# Patient Record
Sex: FEMALE | Race: BLACK OR AFRICAN AMERICAN
[De-identification: names, ages, dates, MRNs, and addresses within clinical notes are randomized per-mention and may not be internally consistent; named-entity substitution may affect disease eponyms.]

---

## 2018-03-13 ENCOUNTER — HOSPITAL ENCOUNTER (EMERGENCY)
Dept: HOSPITAL 14 - H.ER | Age: 33
Discharge: HOME | End: 2018-03-13
Payer: COMMERCIAL

## 2018-03-13 VITALS
RESPIRATION RATE: 18 BRPM | SYSTOLIC BLOOD PRESSURE: 108 MMHG | DIASTOLIC BLOOD PRESSURE: 68 MMHG | HEART RATE: 78 BPM | TEMPERATURE: 97.8 F

## 2018-03-13 VITALS — OXYGEN SATURATION: 100 %

## 2018-03-13 DIAGNOSIS — J01.90: Primary | ICD-10-CM

## 2018-03-13 PROCEDURE — 81025 URINE PREGNANCY TEST: CPT

## 2018-03-13 PROCEDURE — 99282 EMERGENCY DEPT VISIT SF MDM: CPT

## 2018-03-13 PROCEDURE — 70450 CT HEAD/BRAIN W/O DYE: CPT

## 2018-03-13 PROCEDURE — 96372 THER/PROPH/DIAG INJ SC/IM: CPT

## 2018-03-13 PROCEDURE — 87804 INFLUENZA ASSAY W/OPTIC: CPT

## 2018-03-13 NOTE — ED PDOC
HPI: General Adult


Time Seen by Provider: 18 15:18


Chief Complaint (Nursing): ENT Problem


Chief Complaint (Provider): left side facial pain and headache


History Per: Patient


History/Exam Limitations: no limitations


Onset/Duration Of Symptoms: Days (x2)


Current Symptoms Are (Timing): Still Present


Recently: Treated By A Physician


Additional Complaint(s): 





Pee Singh is a 32 year old female, with no significant past medical history

, who presents to the emergency department complaining of left facial pain and 

left sided headache onset for 2 days. Patient also reports congestion and left 

ear pain. Patient states she was seen on Saturday by her PMD for runny nose and 

sore throat and she was prescribed Azithromycin and Promethazine. She took 

Advil yesterday but with no improvement of pain. She denies any vision changes, 

fever, chills, nausea, vomit or dizziness. Patient states prescribed meds have 

not helped with symptoms.





PMD: Abdirahman Gómez 





Past Medical History


Reviewed: Historical Data, Nursing Documentation, Vital Signs


Vital Signs: 


 Last Vital Signs











Temp  97.8 F   18 17:24


 


Pulse  78   18 17:24


 


Resp  18   18 17:24


 


BP  108/68   18 17:24


 


Pulse Ox  100   18 17:34














- Medical History


PMH: No Chronic Diseases





- Surgical History


Surgical History:  (x 2)





- Family History


Family History: States: No Known Family Hx





- Living Arrangements


Living Arrangements: With Family





- Social History


Current smoker - smoking cessation education provided: No


Alcohol: None


Drugs: Denies





- Home Medications


Home Medications: 


 Ambulatory Orders











 Medication  Instructions  Recorded


 


Fluticasone Nasal [Flonase] 1 spray NS DAILY #1 spr 18


 


Levofloxacin [Levaquin] 500 mg PO DAILY #7 tablet 18


 


Methylprednisolone [Medrol Dose 4 mg PO ASDIR #21 mg 18





Pack (21 tabs)]  














- Allergies


Allergies/Adverse Reactions: 


 Allergies











Allergy/AdvReac Type Severity Reaction Status Date / Time


 


No Known Allergies Allergy   Verified 18 15:01














Review of Systems


ROS Statement: Except As Marked, All Systems Reviewed And Found Negative


Constitutional: Negative for: Fever, Chills


Eyes: Negative for: Vision Change


ENT: Positive for: Nose Congestion (and sinus)


Gastrointestinal: Negative for: Nausea, Vomiting


Neurological: Positive for: Headache (left sided and left facial pain).  

Negative for: Dizziness





Physical Exam





- Reviewed


Nursing Documentation Reviewed: Yes


Vital Signs Reviewed: Yes





- Physical Exam


Appears: Positive for: Non-toxic, No Acute Distress


Head Exam: Positive for: ATRAUMATIC, NORMAL INSPECTION, NORMOCEPHALIC


Skin: Positive for: Normal Color.  Negative for: Rash


Eye Exam: Positive for: Normal appearance, EOMI, PERRL


ENT: Positive for: Normal ENT Inspection


Neck: Positive for: Painless ROM, Supple.  Negative for: Pain On Movement Of 

Neck


Cardiovascular/Chest: Positive for: Regular Rate, Rhythm.  Negative for: Murmur


Respiratory: Positive for: Normal Breath Sounds.  Negative for: Respiratory 

Distress


Gastrointestinal/Abdominal: Positive for: Normal Exam, Soft.  Negative for: 

Tenderness


Extremity: Positive for: Normal ROM.  Negative for: Tenderness, Deformity, 

Swelling


Neurologic/Psych: Positive for: Alert, CNs II-XII (grossly intact), Oriented.  

Negative for: Motor/Sensory Deficits, Aphasia, Facial Droop





- Laboratory Results


Urine Pregnancy POC: Negative





- ECG


O2 Sat by Pulse Oximetry: 100 (RA)


Pulse Ox Interpretation: Normal





- Other Rad


  ** CT head


X-Ray: Read By Radiologist


X-Ray Interpretation: see below





Medical Decision Making


Medical Decision Making: 


 Initial Impression: 33 y/o female with left side facial pain and headache





Initial Plan:


--Head w/o contrast [CT]


--Tylenol 325mg tab 975 mg PO


--Motrin tab 600 mg PO


--Reglan 10 mg IM


--Influenza A B


--Reevaluation





Flu is negative





16:45


Head CT


FINDINGS: HEMORRHAGE: No intracranial hemorrhage.  BRAIN: No mass effect or 

edema.  No atrophy or chronic microvascular ischemic changes. VENTRICLES:


No hydrocephalus. CALVARIUM: Unremarkable. PARANASAL SINUSES: Partially imaged 

paranasal sinuses; fluid and mucosal thickening involving the left greater than 

right ethmoid air cells, the left maxillary sinus, left frontal sinus, and left 

sphenoid sinus. MASTOID AIR CELLS: Unremarkable as visualized. No inflammatory 

changes. OTHER FINDINGS: None. IMPRESSION: No acute intracranial pathology 

identified. Partially imaged paranasal sinuses demonstrate: Fluid and mucosal 

thickening involving the left greater than right ethmoid air cells, left 

maxillary sinus, left frontal sinus, and left sphenoid sinus. Correlate 

clinically for acute sinusitis.





Patient is aware of CT findings.  She states pain is now resolved.  Will d/c 

with rx levaqun, flonase and medrol dose pack.  Also advised patient to 

continue with NSAID's for pain.  ENT referral given. 





--------------------------------------------------------------------------------

-----------------~








Scribe Attestation:


Documented by Morro Gudino, acting as a scribe for Ashely Rawls PA-C.





Provider Scribe Attestation:


All medical record entries made by the Scribe were at my direction and 

personally dictated by me. I have reviewed the chart and agree that the record 

accurately reflects my personal performance of the history, physical exam, 

medical decision making, and the department course for this patient. I have 

also personally directed, reviewed, and agree with the discharge instructions 

and disposition.





Disposition





- Clinical Impression


Clinical Impression: 


 Sinusitis








- Patient ED Disposition


Is Patient to be Admitted: No


Counseled Patient/Family Regarding: Studies Performed, Diagnosis, Need For 

Followup, Rx Given





- Disposition


Referrals: 


Howard Farley MD [Staff Provider] - 


Disposition: Routine/Home


Disposition Time: 17:02


Condition: IMPROVED


Additional Instructions: 


Stop taking current antibiotics and take new rx meds instead.  Take 3 advil 

every 6 hrs along with 2 tylenol for pain.  You can take the advil and tylenol 

with the rest of the prescribed meds.   If symptoms persist, follow up with ear

, nose and throat (ENT) specialist. 


Prescriptions: 


Fluticasone Nasal [Flonase] 1 spray NS DAILY #1 spr


Levofloxacin [Levaquin] 500 mg PO DAILY #7 tablet


Methylprednisolone [Medrol Dose Pack (21 tabs)] 4 mg PO ASDIR #21 mg


Instructions:  Sinusitis in Adults


Forms:  [employer] (English), [facility] ED School/Work Excuse

## 2018-03-13 NOTE — CT
PROCEDURE:  CT HEAD WITHOUT CONTRAST.



HISTORY:

left side headache



COMPARISON:

None available. 



TECHNIQUE:

Axial computed tomography images were obtained through the head/brain 

without intravenous contrast.  



Radiation dose:



Total exam DLP = 836.08 mGy-cm.



This CT exam was performed using one or more of the following dose 

reduction techniques: Automated exposure control, adjustment of the 

mA and/or kV according to patient size, and/or use of iterative 

reconstruction technique.



FINDINGS:



HEMORRHAGE:

No intracranial hemorrhage. 



BRAIN:

No mass effect or edema.  No atrophy or chronic microvascular 

ischemic changes.



VENTRICLES:

No hydrocephalus. 



CALVARIUM:

Unremarkable.



PARANASAL SINUSES:

Partially imaged paranasal sinuses; fluid and mucosal thickening 

involving the left greater than right ethmoid air cells, the left 

maxillary sinus, left frontal sinus, and left sphenoid sinus.



MASTOID AIR CELLS:

Unremarkable as visualized. No inflammatory changes.



OTHER FINDINGS:

None.



IMPRESSION:

No acute intracranial pathology identified.



Partially imaged paranasal sinuses demonstrate: Fluid and mucosal 

thickening involving the left greater than right ethmoid air cells, 

left maxillary sinus, left frontal sinus, and left sphenoid sinus. 

Correlate clinically for acute sinusitis.